# Patient Record
Sex: MALE | Race: WHITE | NOT HISPANIC OR LATINO | Employment: FULL TIME | URBAN - METROPOLITAN AREA
[De-identification: names, ages, dates, MRNs, and addresses within clinical notes are randomized per-mention and may not be internally consistent; named-entity substitution may affect disease eponyms.]

---

## 2021-11-25 ENCOUNTER — HOSPITAL ENCOUNTER (EMERGENCY)
Facility: CLINIC | Age: 41
Discharge: HOME | End: 2021-11-25
Attending: EMERGENCY MEDICINE | Admitting: EMERGENCY MEDICINE

## 2021-11-25 VITALS
DIASTOLIC BLOOD PRESSURE: 80 MMHG | SYSTOLIC BLOOD PRESSURE: 132 MMHG | RESPIRATION RATE: 16 BRPM | HEART RATE: 81 BPM | TEMPERATURE: 97.8 F | OXYGEN SATURATION: 98 %

## 2021-11-25 DIAGNOSIS — R36.1 HEMOSPERMIA: ICD-10-CM

## 2021-11-25 LAB
ALBUMIN UR-MCNC: NEGATIVE MG/DL
APPEARANCE UR: ABNORMAL
BILIRUB UR QL STRIP: NEGATIVE
COLOR UR AUTO: ABNORMAL
GLUCOSE UR STRIP-MCNC: NEGATIVE MG/DL
HGB UR QL STRIP: ABNORMAL
KETONES UR STRIP-MCNC: NEGATIVE MG/DL
LEUKOCYTE ESTERASE UR QL STRIP: NEGATIVE
MUCOUS THREADS #/AREA URNS LPF: PRESENT /LPF
NITRATE UR QL: NEGATIVE
PH UR STRIP: 7.5 [PH] (ref 5–7)
RBC URINE: 3 /HPF
SP GR UR STRIP: 1.02 (ref 1–1.03)
SPERM #/AREA URNS HPF: PRESENT /HPF
UROBILINOGEN UR STRIP-MCNC: NORMAL MG/DL
WBC URINE: 2 /HPF

## 2021-11-25 PROCEDURE — 99283 EMERGENCY DEPT VISIT LOW MDM: CPT

## 2021-11-25 PROCEDURE — 81001 URINALYSIS AUTO W/SCOPE: CPT | Performed by: EMERGENCY MEDICINE

## 2021-11-25 ASSESSMENT — ENCOUNTER SYMPTOMS
CONSTIPATION: 0
DYSURIA: 0

## 2021-11-26 NOTE — ED PROVIDER NOTES
History   Chief Complaint:  Hematospermia     HPI   Bk Riggs is a 41 year old male who presents with hematospermia. Per the patient, he notes some mild pain at the tip of his penis which he believes might be related to grooming, along with feeling warm for the past few days. Tonight he noted hematospermia. He denies dysuria, constipation, pain with bowel movements, and any other pain.    Review of Systems   Gastrointestinal: Negative for constipation.   Genitourinary: Negative for dysuria.        Positive for hematospermia   All other systems reviewed and are negative.      Allergies:  The patient has no known allergies.     Medications:  The patient is not currently taking any prescribed medications.    Past Medical History:     The patient denies past medical history.    Social History:  The patient was accompanied to the ER by his spouse  Marital Status:     Physical Exam     Patient Vitals for the past 24 hrs:   BP Temp Temp src Pulse Resp SpO2   11/25/21 2133 132/80 97.8  F (36.6  C) Temporal 81 16 98 %       Physical Exam   Eyes:  The pupils are equal and round    Conjunctivae and sclerae are normal  ENT:    The nose is normal    Pinnae are normal  CV:  Regular rate and rhythm     No edema  Resp:  Normal respiratory effort    Speaks in full sentences  GI:  Abdomen is soft and non-tender, there is no rigidity    No distension    No rebound tenderness   Gu:  Normal penis. No lesions/tenderness  MS:  Normal muscular tone    No asymmetric leg swelling  Skin:  No rash or acute skin lesions noted  Neuro:   Awake, alert.      Speech is normal and fluent.    Face is symmetric.     Moves all extremities      Emergency Department Course     Laboratory:  Labs Ordered and Resulted from Time of ED Arrival to Time of ED Departure   ROUTINE UA WITH MICROSCOPIC REFLEX TO CULTURE - Abnormal       Result Value    Color Urine Light Yellow      Appearance Urine Slightly Cloudy (*)     Glucose Urine Negative       Bilirubin Urine Negative      Ketones Urine Negative      Specific Gravity Urine 1.016      Blood Urine Trace (*)     pH Urine 7.5 (*)     Protein Albumin Urine Negative      Urobilinogen Urine Normal      Nitrite Urine Negative      Leukocyte Esterase Urine Negative      Mucus Urine Present (*)     Sperm Urine Present (*)     RBC Urine 3 (*)     WBC Urine 2        Emergency Department Course:  Reviewed:  I reviewed nursing notes, vitals and past medical history    Assessments:  2118 I obtained history and examined the patient as noted above.   2215 I rechecked the patient and explained findings.     Disposition:  The patient was discharged to home.     Impression & Plan       Medical Decision Making:  Bk Riggs is a 41 year old male who presents to the emergency department with blood in his sperm and semen. He notes that this started tonight for the first time. He has had some sensations of feeling warm recently but has not had any measured fevers and his temperature here was normal. He has noticed some discomfort at the base of his penis but he thought it might be related to grooming. On exam, there are no external abnormalities. He denied any rectal pain. Urinalysis here did show a few red blood cells but otherwise was not suspicious for infection. Denied any concerns about sexually transmitted infections and has not traveled to the [facility] or [place]. I recommended follow-up with urology. Return with any new or worsening symptoms.    Diagnosis:    ICD-10-CM    1. Hemospermia  R36.1      Scribe Disclosure:  I, Jesús Klein, am serving as a scribe at 9:15 PM on 11/25/2021 to document services personally performed by Ronni Caballero MD based on my observations and the provider's statements to me.        Ronni Caballero MD  11/25/21 [id number]

## 2021-11-26 NOTE — DISCHARGE INSTRUCTIONS
Monitor for continued blood in the semen. Watch for painful urination. Please follow up with urology.

## 2021-11-26 NOTE — ED TRIAGE NOTES
Patient here with blood in his semen after intercourse. He denies penile pain and no abdominal pain but has some pain at the base of the penis

## 2023-03-19 ENCOUNTER — HOSPITAL ENCOUNTER (OUTPATIENT)
Dept: MRI IMAGING | Facility: CLINIC | Age: 43
Discharge: HOME | End: 2023-03-19
Attending: PHYSICIAN ASSISTANT | Admitting: PHYSICIAN ASSISTANT
Payer: COMMERCIAL

## 2023-03-19 DIAGNOSIS — K60.30 ANAL FISTULA: ICD-10-CM

## 2023-03-19 DIAGNOSIS — K61.1 PERIRECTAL ABSCESS: ICD-10-CM

## 2023-03-19 PROCEDURE — 72197 MRI PELVIS W/O & W/DYE: CPT

## 2023-03-19 PROCEDURE — 255N000002 HC RX 255 OP 636: Performed by: PHYSICIAN ASSISTANT

## 2023-03-19 PROCEDURE — A9585 GADOBUTROL INJECTION: HCPCS | Performed by: PHYSICIAN ASSISTANT

## 2023-03-19 RX ORDER — GADOBUTROL 604.72 MG/ML
6 INJECTION INTRAVENOUS ONCE
Status: COMPLETED | OUTPATIENT
Start: 2023-03-19 | End: 2023-03-19

## 2023-03-19 RX ADMIN — GADOBUTROL 6 ML: 604.72 INJECTION INTRAVENOUS at 09:25

## 2025-05-14 ENCOUNTER — HOSPITAL ENCOUNTER (EMERGENCY)
Facility: CLINIC | Age: 45
Discharge: HOME | End: 2025-05-15
Attending: EMERGENCY MEDICINE | Admitting: EMERGENCY MEDICINE
Payer: COMMERCIAL

## 2025-05-14 DIAGNOSIS — K61.0 PERIANAL ABSCESS: ICD-10-CM

## 2025-05-14 PROCEDURE — 99283 EMERGENCY DEPT VISIT LOW MDM: CPT

## 2025-05-14 PROCEDURE — 10060 I&D ABSCESS SIMPLE/SINGLE: CPT

## 2025-05-14 ASSESSMENT — ACTIVITIES OF DAILY LIVING (ADL)
ADLS_ACUITY_SCORE: 41
ADLS_ACUITY_SCORE: 41

## 2025-05-14 ASSESSMENT — COLUMBIA-SUICIDE SEVERITY RATING SCALE - C-SSRS
1. IN THE PAST MONTH, HAVE YOU WISHED YOU WERE DEAD OR WISHED YOU COULD GO TO SLEEP AND NOT WAKE UP?: NO
2. HAVE YOU ACTUALLY HAD ANY THOUGHTS OF KILLING YOURSELF IN THE PAST MONTH?: NO
6. HAVE YOU EVER DONE ANYTHING, STARTED TO DO ANYTHING, OR PREPARED TO DO ANYTHING TO END YOUR LIFE?: NO

## 2025-05-15 VITALS
WEIGHT: 144 LBS | OXYGEN SATURATION: 97 % | RESPIRATION RATE: 16 BRPM | DIASTOLIC BLOOD PRESSURE: 78 MMHG | HEART RATE: 71 BPM | TEMPERATURE: 98 F | SYSTOLIC BLOOD PRESSURE: 116 MMHG

## 2025-05-15 PROCEDURE — 250N000009 HC RX 250: Performed by: EMERGENCY MEDICINE

## 2025-05-15 RX ORDER — LIDOCAINE AND PRILOCAINE 25; 25 MG/G; MG/G
1 CREAM TOPICAL ONCE
Status: COMPLETED | OUTPATIENT
Start: 2025-05-15 | End: 2025-05-15

## 2025-05-15 RX ADMIN — LIDOCAINE AND PRILOCAINE 1 G: 25; 25 CREAM TOPICAL at 00:38

## 2025-05-15 ASSESSMENT — ACTIVITIES OF DAILY LIVING (ADL): ADLS_ACUITY_SCORE: 41

## 2025-05-15 NOTE — ED TRIAGE NOTES
Pt c/o cyst near rectum area starting today. Reports hx abscess near rectum area. Pt states he thought it was a pimple and now has swelled up this afternoon.      Triage Assessment (Adult)       Row Name 05/14/25 2121 05/14/25 2120       Triage Assessment    Airway WDL WDL WDL       Respiratory WDL    Respiratory WDL WDL WDL       Skin Circulation/Temperature WDL    Skin Circulation/Temperature WDL X  cyst near rectum area X  cyst near rectum area       Cardiac WDL    Cardiac WDL WDL WDL       Peripheral/Neurovascular WDL    Peripheral Neurovascular WDL WDL WDL       Cognitive/Neuro/Behavioral WDL    Cognitive/Neuro/Behavioral WDL WDL WDL

## 2025-05-15 NOTE — ED PROVIDER NOTES
Emergency Department Note      History of Present Illness     Chief Complaint   Rectal pain     HPI   Bk Riggs is a 44 year old male presenting to the emergency department for evaluation of rectal pain. The patient reports he noticed irritation of his perirectal area on the morning of 5/14/25. He states that he initially thought that there was a pimple on the affected area when he was wiping after a bowel movement early in the day. However, following a bowel movement at approximately 2000, he noticed the affected area was much larger, and approximately one centimeter in diameter. He does note the swelling has decreased over the past couple of hours. He indicates a history of multiple abscesses in this region. He denies any history of Crohn's disease or ulcerative colitis.     Independent Historian   None    Review of External Notes   I reviewed an urgent care note from 30 December 2024    Past Medical History     Medical History and Problem List   Patient denies past medical history     Medications   The patient denies any significant past medical history.     Surgical History   None     Physical Exam     Patient Vitals for the past 24 hrs:   BP Temp Temp src Pulse Resp SpO2 Weight   05/14/25 2120 137/81 98.2  F (36.8  C) Oral 82 16 99 % 65.3 kg (144 lb)     Physical Exam  General: Alert, No distress. Nontoxic appearance  Head: No signs of trauma.   Mouth/Throat: Oropharynx moist.   Eyes: Conjunctivae are normal. Pupils are equal..   Neck: Normal range of motion.    CV: Appears well perfused.  Resp:No respiratory distress.   MSK: Normal range of motion. No obvious deformity.   Neuro: The patient is alert and interactive. [place]. Speech normal. GCS 15  Skin: No lesion or sign of trauma noted. Irritation and swelling of the left posterior quadrant of the perirectal space.   Psych: normal mood and affect. behavior is normal.           Diagnostics     Lab Results   Labs Ordered and Resulted from Time of ED  Arrival to Time of ED Departure - No data to display    Imaging   No orders to display       EKG   None     Independent Interpretation   None    ED Course      Medications Administered   Medications   lidocaine-prilocaine (EMLA) cream 1 g (1 g Topical $Given 5/15/25 0038)       Procedures     Incision and Drainage     Procedure: Incision and Drainage     Consent: Verbal    Indication: Tenderness     Location: Anogenital    Size: 1 cm diameter     Ultrasound Guidance: No    Preparation: Chlorhexidine     Anesthesia/Sedation: Topical - EMLA     Procedure Detail:    Aspiration: No  Incision Type: Single straight  Scalpel: 11  Lesion Management: Probed and deloculated   Wound Management: Left open   Packing: None     Patient Status: The patient tolerated the procedure well: Yes. There were no complications.       Discussion of Management   None    ED Course   ED Course as of 05/15/25 0040   Thu May 15, 2025   0029 I obtained history and examined the patient as noted above.        Additional Documentation  None    Medical Decision Making / Diagnosis     CMS Diagnoses: None    MIPS   None      [facility]   Bk Riggs is a 44 year old male presents to the ER with perianal swelling and pain.  This is consistent with a small perianal abscess.  No systemic signs of illness.  The patient underwent incision and drainage of these abscess.  The small amount of purulent material was expressed.  The wound was left open.  The patient was given instructions for follow-up with colorectal surgery as this is recurrent.    Disposition   The patient was discharged.     Diagnosis     ICD-10-CM    1. Perianal abscess  K61.0            Discharge Medications   New Prescriptions    No medications on file         Scribe Disclosure:  Abelardo MARTEL, am serving as a scribe at 12:39 AM on 5/15/2025 to document services personally performed by Chalino Flores MD based on my observations and the provider's statements to me.        Chalino Flores  MD Jah  05/15/25 [id number]

## 2025-05-18 ENCOUNTER — HEALTH MAINTENANCE LETTER (OUTPATIENT)
Age: 45
End: 2025-05-18